# Patient Record
Sex: FEMALE | Race: WHITE | HISPANIC OR LATINO | Employment: FULL TIME | URBAN - METROPOLITAN AREA
[De-identification: names, ages, dates, MRNs, and addresses within clinical notes are randomized per-mention and may not be internally consistent; named-entity substitution may affect disease eponyms.]

---

## 2024-07-30 ENCOUNTER — OFFICE VISIT (OUTPATIENT)
Dept: PODIATRY | Facility: CLINIC | Age: 39
End: 2024-07-30
Payer: COMMERCIAL

## 2024-07-30 VITALS — HEIGHT: 63 IN | WEIGHT: 155.38 LBS | BODY MASS INDEX: 27.53 KG/M2

## 2024-07-30 DIAGNOSIS — M76.72 TENDINITIS OF LEFT PERONEUS BREVIS TENDON: Primary | ICD-10-CM

## 2024-07-30 DIAGNOSIS — M79.672 BILATERAL FOOT PAIN: ICD-10-CM

## 2024-07-30 DIAGNOSIS — L84 CORN OR CALLUS: ICD-10-CM

## 2024-07-30 DIAGNOSIS — M79.671 BILATERAL FOOT PAIN: ICD-10-CM

## 2024-07-30 PROCEDURE — 3008F BODY MASS INDEX DOCD: CPT | Mod: CPTII,S$GLB,, | Performed by: PODIATRIST

## 2024-07-30 PROCEDURE — 1159F MED LIST DOCD IN RCRD: CPT | Mod: CPTII,S$GLB,, | Performed by: PODIATRIST

## 2024-07-30 PROCEDURE — 99203 OFFICE O/P NEW LOW 30 MIN: CPT | Mod: S$GLB,,, | Performed by: PODIATRIST

## 2024-07-30 PROCEDURE — 1160F RVW MEDS BY RX/DR IN RCRD: CPT | Mod: CPTII,S$GLB,, | Performed by: PODIATRIST

## 2024-07-30 NOTE — LETTER
July 30, 2024      [facility] - Podiatry  [address]  [place] [address]  Phone: [phone]       Patient: Jazmine Roblero   YOB: 1985  Date of Visit: 07/30/2024    To Whom It May Concern:    Zahra Roblero  was at [facility] on 07/30/2024. The patient may return to work/school on 07/31/2024 with no restrictions. If you have any questions or concerns, or if I can be of further assistance, please do not hesitate to contact me.    Sincerely,      Kelli Stacy MA

## 2024-07-30 NOTE — PROGRESS NOTES
Subjective:     Patient ID: Jazmine Roblero is a 38 y.o. female    Chief Complaint: Foot Pain       Jazmine is a 38 y.o. female who presents to the podiatry clinic  with complaint of  bilateral foot pain. Onset of the symptoms was several weeks ago. Precipitating event: increased activity and growth of painful soft tissue lesions on the bottom of each foot . Current symptoms include: ability to bear weight, but with some pain and worsening symptoms after a period of activity. Aggravating factors: standing, walking, and direct contact to soft tissue lesions plantar aspect bilateral foot . Symptoms have gradually worsened. Patient has had prior foot problems. Evaluation to date: none. Treatment to date: none. Patients rates pain 8/10 on pain scale.    Review of Systems   Constitutional: Negative.  Negative for chills and fever.   Respiratory:  Negative for cough and shortness of breath.    Cardiovascular:  Negative for chest pain and leg swelling.   Gastrointestinal:  Negative for diarrhea, nausea and vomiting.   Neurological:  Negative for tingling.        Objective:   Physical Exam  Vitals reviewed.   Constitutional:       General: She is not in acute distress.     Appearance: Normal appearance. She is not ill-appearing.   HENT:      Head: Normocephalic.      Nose: Nose normal.   Cardiovascular:      Pulses:           Dorsalis pedis pulses are 2+ on the right side and 2+ on the left side.        Posterior tibial pulses are 2+ on the right side and 2+ on the left side.   Pulmonary:      Effort: Pulmonary effort is normal. No respiratory distress.   Musculoskeletal:      Left ankle: Tenderness present over the base of 5th metatarsal.   Feet:      Right foot:      Skin integrity: Callus present.      Left foot:      Skin integrity: Callus present.   Skin:     Capillary Refill: Capillary refill takes 2 to 3 seconds.   Neurological:      Mental Status: She is alert and oriented to person, place, and time.    Psychiatric:         Mood and Affect: Mood normal.         Behavior: Behavior normal.         Thought Content: Thought content normal.         Judgment: Judgment normal.        Foot Exam    General  General Appearance: appears stated age and healthy   Orientation: alert and oriented to person, place, and time   Affect: appropriate   Gait: antalgic       Right Foot/Ankle     Inspection and Palpation  Tenderness: (Plantar right 5th MPJ at site of soft tissue lesion)  Arch: normal  Skin Exam: callus and corn (plantar right 5th MPJ);     Neurovascular  Dorsalis pedis: 2+  Posterior tibial: 2+    Muscle Strength  Ankle dorsiflexion: 5  Ankle plantar flexion: 5  Ankle inversion: 5  Ankle eversion: 5  Great toe extension: 5  Great toe flexion: 5      Left Foot/Ankle      Inspection and Palpation  Tenderness: fifth metatarsal base (Plantar left 4th MPJ at site of soft tissue lesion)  Swelling: fifth metatarsal base   Arch: normal  Skin Exam: callus and corn (plantar left 4th MPJ);     Neurovascular  Dorsalis pedis: 2+  Posterior tibial: 2+    Muscle Strength  Ankle dorsiflexion: 5  Ankle plantar flexion: 5  Ankle inversion: 5  Ankle eversion: 5  Great toe extension: 5  Great toe flexion: 5      Dermatologic: Small Darian's corn noted to the lateral aspect of the right 5th digit nail plate  Musculoskeletal: Pain and tenderness with palpation range of motion of the peroneal brevis tendon at the insertion of the left 5th metatarsal base    Assessment:         1. Tendinitis of left peroneus brevis tendon    2. Corn or callus    3. Bilateral foot pain       Plan:     Jazmine Roblero was seen today for   Chief Complaint   Patient presents with    Foot Pain       Assessment & Plan           Procedures     1. Patient was examined and evaluated.    2. Discussed with patient etiology of tendinitis related to excess pain and accommodative walking related to growth is soft tissue lesions on the plantar aspect of each foot.   Patient was advised to ice and elevate bilateral lower extremity end of days and consider initiation of oral OTC analgesics/NSAIDs for pain relief.  Discussed with patient potential benefits of local corticosteroid injection or oral Medrol Dosepak should pain complaints persist over time.    3. Discussed with patient etiology of callus formation.  Patient was warned of potential recurrence over time.  Patient was dispensed offloading pads for reduction of direct contact to the lesion.  Patient was advised to perform safe debridement at home with a emery board, nail file or pumice stone.  Patient was advised to apply ointments / moisturizer to the area for softening purposes.  4. Patient will follow-up in 2-6 weeks or p.r.n. for complaints      Johnna Lau DPM  [address] [place] [address]  [phone]      This note was created using [employer] direct voice recognition software. Note may have occasional typographical errors that may not have been identified and edited despite initial review prior to signing.